# Patient Record
(demographics unavailable — no encounter records)

---

## 2017-01-19 NOTE — EDDOCDS
Nurse's Notes                                                                                     

[facility]                                                                         

Name: Keyana Lobato                                                                             

Age: 27 yrs                                                                                       

Sex: Female                                                                                       

: 1989                                                                                   

MRN: [id number]                                                                                     

Arrival Date: 2017                                                                          

Time: 18:23                                                                                       

Account#: [id number]                                                                              

Bed Triage 3                                                                                      

Private MD: NO PRIMARY PHYSICIAN, .                                                               

Diagnosis: Urinary tract infection, site not specified                                            

                                                                                                  

Presentation:                                                                                     

                                                                                             

18:37 Presenting complaint: Patient states: pt reports vaginal discharge, denies pain with    ead 

      urination. "I don't know if I have a UTI, BV, or a yeast infection." Reports lower          

      abdominal pain, pressure/cramping. Adult Sepsis Screening: The patient does not have        

      new or worsening altered mentation. Patient's respiratory rate is less than 22.             

      Systolic blood pressure is greater than 100. Patient has a qSOFA score of 0- Negative       

      Sepsis Screen. Suicide/Homicide risk assessment- the patient denies having any suicidal     

      and/or homicidal ideations and does not present with any other emotional, behavioral or     

      mental health complaints.  Status: Patient is not a  or       

      dependent. Transition of care: patient was not received from another setting of care.       

18:37 Acuity: ALEXANDR Level 3                                                                     ead 

18:37 Method Of Arrival: Walkin/Carried/Asstd                                                 ead 

                                                                                                  

Triage Assessment:                                                                                

18:39 General: Appears in no apparent distress, comfortable, Behavior is appropriate for age, ead 

      cooperative. Pain: Denies pain. HIV screening NA for this visit Offered previously. GI:     

      Reports lower abdominal pain. : Reports discharge Denies burning with urination,          

      vaginal bleeding. Derm: Skin is pink, warm & dry.                                         

                                                                                                  

OB/GYN:                                                                                           

18:39 LMP 2016                                                                          ead 

                                                                                                  

Historical:                                                                                       

- Allergies: Aspirin (Unknown); Latex (Rash); Macrobid (skin peeling and tongue swelling); Bactrim;

- Home Meds:                                                                                      

1. none                                                                                         

- PMHx: Anemia;                                                                                   

- PSHx: none;                                                                                     

- Social history: Smoking status: Patient states former smoker of tobacco. No barriers            

to communication noted, The patient speaks fluent English, Speaks appropriately for             

age.                                                                                            

- Family history: Not pertinent.                                                                  

- : The pt / caregiver states he / she is not on anticoagulants. Home medication list             

is obtained from the patient.                                                                   

- Exposure Risk Screening:: None identified.                                                      

                                                                                                  

                                                                                                  

Screenin:29 Screening information is obtained from the patient. Fall risk: No risks identified.     ck1 

      Assistance ADL's: requires no assistance with activities of daily living. Abuse/DV          

      Screen: The patient / caregiver reports he/she is: not in a situation that causes fear,     

      pain or injury. Nutritional screening: No deficits noted. Advance Directives:               

      Currently, there is no health care proxy. home support is adequate.                         

                                                                                                  

Assessment:                                                                                       

21:29 General: Appears in no apparent distress, comfortable, Behavior is appropriate for age, ck1 

      cooperative. Pain: Denies pain. Respiratory: Respiratory effort is unlabored,               

      Respiratory pattern is regular, symmetrical. : Denies burning with urination, vaginal     

      bleeding. Derm: Skin is intact, is healthy with good turgor, Skin is pink, warm & dry.    

                                                                                                  

Vital Signs:                                                                                      

18:25  / 87; Pulse 86; Resp 18 S; Temp 97.6(O); Pulse Ox 100% on R/A; Weight 50.8 kg    gr2 

      (R); Height 5 ft. 4 in. (162.56 cm) (R); Pain 2/10;                                         

21:27  / 80 LA Sitting (auto/reg); Pulse 71; Resp 16; Temp 97.9(O); Pulse Ox 99% on     jrd 

      R/A; Pain 0/10;                                                                             

18:25 Body Mass Index 19.22 (50.80 kg, 162.56 cm)                                             gr2 

                                                                                                  

Vitals:                                                                                           

18:25 Log In Time: 2017 at 18:25.                                                 gr2 

                                                                                                  

ED Course:                                                                                        

18:25 Patient visited by Gabriel Boateng.                                                   gr2 

18:25 NO PRIMARY PHYSICIAN, . is Private Physician.                                           gr2 

18:25 Patient moved to Waiting                                                                gr2 

18:26 Patient visited by Gabriel Boateng.                                                   gr2 

18:27 Patient moved to Pre RCE                                                                gr2 

18:38 Triage Initiated                                                                        ead 

20:31 Patient moved to Triage 3                                                               jrd 

20:34 Patient visited by Alexia Camilo RN.                                              ck1 

20:39 UCG- In Lab Sent.                                                                       jrd 

20:39 Urine Culture Sent.                                                                     jrd 

20:39 UA Sent.                                                                                jrd 

20:43 [facility]                                                             ef1 

20:43 Trev Pollard DO is Attending Physician.                                            ef1 

20:43 Patient visited by Tracee Pacheco PA-C.                                                  ef1 

21:13 Patient visited by Alethea Paez RN.                                                    nn1 

21:25 [facility] is Referral Physician.                               ef1 

21:27 Patient visited by Damien Ramirez PCA.                                               jrd 

21:29 The patient / caregiver is instructed regarding the plan of care and ED course.         ck1 

21:29 No IV's were initiated during this patient's visit. No procedures done that require     ck1 

      assistance.                                                                                 

                                                                                                  

Administered Medications:                                                                         

21:28 Drug: Ciprofloxacin 500 mg [ciprofloxacin 500 mg tablet (1 tabs)] Route: PO;            ck1 

21:28 Drug: Phenazopyridine 200 mg [phenazopyridine 100 mg tablet (2 tabs)] Route: PO;        ck1 

                                                                                                  

                                                                                                  

Order Results:                                                                                    

Lab Order: UA; SPEC'M 17 20:36                                                              

      Test: APPEARANCE, URINE; Value: HAZY; Range: CLEAR; Status: F                               

      Test: COLOR, URINE; Value: YELLOW; Range: YELLOW; Status: F                                 

      Test: PH,URINE; Value: 6.0; Range: 5.0-9.0; Units: UNITS; Status: F                         

      Test: SPECIFIC GRAVITY URINE AUTO; Value: 1.006; Range: 1.002-1.035; Status: F              

      Test: PROTEIN, URINE AUTO; Value: NEGATIVE; Range: NEGATIVE; Units: mg/dL; Status: F        

      Test: GLUCOSE, URINE (UA) AUTO; Value: NEGATIVE; Range: NEGATIVE; Units: mg/dL; Status:     

      F                                                                                           

      Test: KETONE, URINE AUTO; Value: NEGATIVE; Range: NEGATIVE; Units: mg/dL; Status: F         

      Test: UROBILINOGEN, URINE AUTO; Value: 0.2; Range: 0.0-2.0; Units: mg/dL; Status: F         

      Test: BILIRUBIN, URINE AUTO; Value: NEGATIVE; Range: NEGATIVE; Status: F                    

      Test: NITRITE, URINE AUTO; Value: POSITIVE; Range: NEGATIVE; Status: F                      

      Test: LEUKOCYTE ESTERASE, URINE AUTO; Value: 1+; Range: NEGATIVE; Abnormal: Above high      

      normal; Status: F                                                                           

      Test: BLOOD, URINE BLOOD; Value: NEGATIVE; Range: NEGATIVE; Status: F                       

      Test: SPERM, URINE AUTO; Range: NONE; Status: I                                             

      Test: WBC, URINE AUTO; Value: 7; Range: 0-3; Abnormal: Above high normal; Units: /HPF;      

      Status: F                                                                                   

      Test: RBC, URINE AUTO; Value: 2; Range: 0-3; Units: /HPF; Status: F                         

      Test: BACTERIA, URINE AUTO; Value: 2+; Range: NEGATIVE; Abnormal: Above high normal;        

      Status: F                                                                                   

      Test: SQUAMOUS EPITHELIAL CELL UR AU; Value: 5; Range: 0-6; Units: /HPF; Status: F          

      Test: MUCUS, URINE; Value: SMALL; Range: NEGATIVE; Status: F                                

      Test: HYALINE CAST, URINE AUTO; Value: 0; Range: 0-1; Units: /LPF; Status: F                

Lab Order: UCG- In Lab; SPEC'M 17 20:36                                                     

      Test: URINE PREG TEST; Value: NEGATIVE; Range: NEGATIVE; Status: F                          

                                                                                                  

Outcome:                                                                                          

21:25 Discharge ordered by Provider.                                                          ef1 

21:28 Discharge Assessment: Patient awake, alert and oriented x 3. No cognitive and/or        ck1 

      functional deficits noted. Patient verbalized understanding of disposition                  

      instructions. patient administered narcotics - no. The following High Risk Discharge        

      criteria are identified: None. Discharged to home ambulatory. Condition: stable.            

      Discharge instructions given to patient, Instructed on discharge instructions, follow       

      up and referral plans. medication usage, Demonstrated understanding of instructions,        

      medications, Pt was receptive of discharge instructions/ teaching. No special radiology     

      studies were completed. Property :Personal belongings accompany Pt.                         

21:31 Prescriptions given X 2.                                                                ck1 

21:32 Patient left the ED.                                                                    nn1 

                                                                                                  

Signatures:                                                                                       

Alexia Camilo,RN                  RN   ck1                                                  

Tracee Pacheco PAPerlaC                      PA-C ef1                                                  

Gabriel Boateng                            gr2                                                  

Mendy TorreRN                        Damien Blue PCA                   PCA  Alethea Arnold RN                        RN   nn1                                                  

                                                                                                  

**************************************************************************************************

MTDD

## 2017-01-19 NOTE — EDDOCDS
Physician Documentation                                                                           

[facility]                                                                         

Name: Keyana Lobato                                                                             

Age: 27 yrs                                                                                       

Sex: Female                                                                                       

: 1989                                                                                   

MRN: [id number]                                                                                     

Arrival Date: 2017                                                                          

Time: 18:23                                                                                       

Account#: [id number]                                                                              

Bed Triage 3                                                                                      

Private MD: NO PRIMARY PHYSICIAN, .                                                               

Disposition:                                                                                      

17 21:25 Discharged to Home/Self Care. Impression: Urinary tract infection, site not        

specified.                                                                                      

- Condition is Stable.                                                                            

- Discharge Instructions: Urinary Tract Infection, Easy-to-Read.                                  

- Prescriptions for Cipro 500 mg Oral Tablet - take 1 tablet by ORAL route every 12               

hours; 14 tablet. Pyridium 200 mg Oral Tablet - take 1 tablet by ORAL route every 8             

hours for 3 days; 9 tablet.                                                                     

- Medication Reconciliation, Referral List Call for Appointment, Local Pharmacy Hours             

form.                                                                                           

- [facility] ; When: 1 - 2 days; Reason: Recheck                

today's complaints, Continuance of care. Follow up: Emergency Department; Reason:               

Worsening of conditions.                                                                        

- Problem is new.                                                                                 

- Symptoms have improved.                                                                         

                                                                                                  

                                                                                                  

                                                                                                  

Historical:                                                                                       

- Allergies: Aspirin (Unknown); Latex (Rash); Macrobid (skin peeling and tongue swelling); Bactrim;

- Home Meds:                                                                                      

1. none                                                                                         

- PMHx: Anemia;                                                                                   

- PSHx: none;                                                                                     

- Social history: Smoking status: Patient states former smoker of tobacco. No barriers            

to communication noted, The patient speaks fluent English, Speaks appropriately for             

age.                                                                                            

- Family history: Not pertinent.                                                                  

- : The pt / caregiver states he / she is not on anticoagulants. Home medication list             

is obtained from the patient.                                                                   

- Exposure Risk Screening:: None identified.                                                      

                                                                                                  

                                                                                                  

OB/GYN:                                                                                           

                                                                                             

18:39 LMP 2016                                                                          ead 

                                                                                                  

Vital Signs:                                                                                      

18:25  / 87; Pulse 86; Resp 18 S; Temp 97.6(O); Pulse Ox 100% on R/A; Weight 50.8 kg /  gr2 

      111.99 lbs (R); Height 5 ft. 4 in. (162.56 cm) (R); Pain 2/10;                              

21:27  / 80 LA Sitting (auto/reg); Pulse 71; Resp 16; Temp 97.9(O); Pulse Ox 99% on     jrd 

      R/A; Pain 0/10;                                                                             

18:25 Body Mass Index 19.22 (50.80 kg, 162.56 cm)                                             gr2 

                                                                                                  

MDM:                                                                                              

18:28 UA Ordered.                                                                             EDMS

18:28 Urine Culture Ordered.                                                                  EDMS

18:28 UCG- In Lab Ordered.                                                                    EDMS

21:17 UA Reviewed.                                                                            ef1 

21:17 UCG- In Lab Reviewed.                                                                   ef1 

21:24 Ciprofloxacin 500 mg PO once ordered.                                                   ef1 

21:24 Phenazopyridine 200 mg PO once ordered.                                                 ef1 

                                                                                                  

Administered Medications:                                                                         

21:28 Drug: Ciprofloxacin 500 mg [ciprofloxacin 500 mg tablet (1 tabs)] Route: PO;            ck1 

21:28 Drug: Phenazopyridine 200 mg [phenazopyridine 100 mg tablet (2 tabs)] Route: PO;        ck1 

                                                                                                  

                                                                                                  

Signatures:                                                                                       

Dispatcher [employer]                           EDAlexia Nye,RN                  RN   ck1                                                  

Tracee Pacheco, PA-C                      PA-C ef1                                                  

Mendy Torre,RN                        RN   Alethea BermudezRN                        RN   nn1                                                  

                                                                                                  

**************************************************************************************************

MTDD

## 2017-01-21 NOTE — EDDOCDS
Nurse's Notes                                                                                     

[facility]                                                                         

Name: Keyana Lobato                                                                             

Age: 27 yrs                                                                                       

Sex: Female                                                                                       

: 1989                                                                                   

MRN: [id number]                                                                                     

Arrival Date: 2017                                                                          

Time: 18:23                                                                                       

Account#: [id number]                                                                              

Bed Triage 3                                                                                      

Private MD: NO PRIMARY PHYSICIAN, .                                                               

Diagnosis: Urinary tract infection, site not specified                                            

                                                                                                  

Presentation:                                                                                     

                                                                                             

18:37 Presenting complaint: Patient states: pt reports vaginal discharge, denies pain with    ead 

      urination. "I don't know if I have a UTI, BV, or a yeast infection." Reports lower          

      abdominal pain, pressure/cramping. Adult Sepsis Screening: The patient does not have        

      new or worsening altered mentation. Patient's respiratory rate is less than 22.             

      Systolic blood pressure is greater than 100. Patient has a qSOFA score of 0- Negative       

      Sepsis Screen. Suicide/Homicide risk assessment- the patient denies having any suicidal     

      and/or homicidal ideations and does not present with any other emotional, behavioral or     

      mental health complaints.  Status: Patient is not a  or       

      dependent. Transition of care: patient was not received from another setting of care.       

18:37 Acuity: ALEXANDR Level 3                                                                     ead 

18:37 Method Of Arrival: Walkin/Carried/Asstd                                                 ead 

                                                                                                  

Triage Assessment:                                                                                

18:39 General: Appears in no apparent distress, comfortable, Behavior is appropriate for age, ead 

      cooperative. Pain: Denies pain. HIV screening NA for this visit Offered previously. GI:     

      Reports lower abdominal pain. : Reports discharge Denies burning with urination,          

      vaginal bleeding. Derm: Skin is pink, warm & dry.                                         

                                                                                                  

OB/GYN:                                                                                           

18:39 LMP 2016                                                                          ead 

                                                                                                  

Historical:                                                                                       

- Allergies: Aspirin (Unknown); Latex (Rash); Macrobid (skin peeling and tongue swelling); Bactrim;

- Home Meds:                                                                                      

1. none                                                                                         

- PMHx: Anemia;                                                                                   

- PSHx: none;                                                                                     

- Social history: Smoking status: Patient states former smoker of tobacco. No barriers            

to communication noted, The patient speaks fluent English, Speaks appropriately for             

age.                                                                                            

- Family history: Not pertinent.                                                                  

- : The pt / caregiver states he / she is not on anticoagulants. Home medication list             

is obtained from the patient.                                                                   

- Exposure Risk Screening:: None identified.                                                      

                                                                                                  

                                                                                                  

Screenin:29 Screening information is obtained from the patient. Fall risk: No risks identified.     ck1 

      Assistance ADL's: requires no assistance with activities of daily living. Abuse/DV          

      Screen: The patient / caregiver reports he/she is: not in a situation that causes fear,     

      pain or injury. Nutritional screening: No deficits noted. Advance Directives:               

      Currently, there is no health care proxy. home support is adequate.                         

                                                                                                  

Assessment:                                                                                       

21:29 General: Appears in no apparent distress, comfortable, Behavior is appropriate for age, ck1 

      cooperative. Pain: Denies pain. Respiratory: Respiratory effort is unlabored,               

      Respiratory pattern is regular, symmetrical. : Denies burning with urination, vaginal     

      bleeding. Derm: Skin is intact, is healthy with good turgor, Skin is pink, warm & dry.    

                                                                                                  

Vital Signs:                                                                                      

18:25  / 87; Pulse 86; Resp 18 S; Temp 97.6(O); Pulse Ox 100% on R/A; Weight 50.8 kg    gr2 

      (R); Height 5 ft. 4 in. (162.56 cm) (R); Pain 2/10;                                         

21:27  / 80 LA Sitting (auto/reg); Pulse 71; Resp 16; Temp 97.9(O); Pulse Ox 99% on     jrd 

      R/A; Pain 0/10;                                                                             

18:25 Body Mass Index 19.22 (50.80 kg, 162.56 cm)                                             gr2 

                                                                                                  

Vitals:                                                                                           

18:25 Log In Time: 2017 at 18:25.                                                 gr2 

                                                                                                  

ED Course:                                                                                        

18:25 Patient visited by Gabriel Boateng.                                                   gr2 

18:25 NO PRIMARY PHYSICIAN, . is Private Physician.                                           gr2 

18:25 Patient moved to Waiting                                                                gr2 

18:26 Patient visited by Gabriel Boateng.                                                   gr2 

18:27 Patient moved to Pre RCE                                                                gr2 

18:38 Triage Initiated                                                                        ead 

20:31 Patient moved to Triage 3                                                               jrd 

20:34 Patient visited by Alexia Camilo RN.                                              ck1 

20:39 UCG- In Lab Sent.                                                                       jrd 

20:39 Urine Culture Sent.                                                                     jrd 

20:39 UA Sent.                                                                                jrd 

20:43 [facility]                                                             ef1 

20:43 Trev Pollard DO is Attending Physician.                                            ef1 

20:43 Patient visited by Tracee Pacheco PA-C.                                                  ef1 

21:13 Patient visited by Alethea Paez RN.                                                    nn1 

21:25 [facility] is Referral Physician.                               ef1 

21:27 Patient visited by Damien Ramirez PCA.                                               jrd 

21:29 The patient / caregiver is instructed regarding the plan of care and ED course.         ck1 

21:29 No IV's were initiated during this patient's visit. No procedures done that require     ck1 

      assistance.                                                                                 

21:35 [facility] Payment Agreement was scanned into [employer] and attached to record.               gjb 

                                                                                             

10:33 T-Sheet-- Draft Copy was scanned into [employer] and attached to record.                   gb  

                                                                                                  

Administered Medications:                                                                         

                                                                                             

21:28 Drug: Ciprofloxacin 500 mg [ciprofloxacin 500 mg tablet (1 tabs)] Route: PO;            ck1 

21:28 Drug: Phenazopyridine 200 mg [phenazopyridine 100 mg tablet (2 tabs)] Route: PO;        ck1 

                                                                                                  

                                                                                                  

Order Results:                                                                                    

Lab Order: UA; SPEC'M 17 20:36                                                              

      Test: APPEARANCE, URINE; Value: HAZY; Range: CLEAR; Status: F                               

      Test: COLOR, URINE; Value: YELLOW; Range: YELLOW; Status: F                                 

      Test: PH,URINE; Value: 6.0; Range: 5.0-9.0; Units: UNITS; Status: F                         

      Test: SPECIFIC GRAVITY URINE AUTO; Value: 1.006; Range: 1.002-1.035; Status: F              

      Test: PROTEIN, URINE AUTO; Value: NEGATIVE; Range: NEGATIVE; Units: mg/dL; Status: F        

      Test: GLUCOSE, URINE (UA) AUTO; Value: NEGATIVE; Range: NEGATIVE; Units: mg/dL; Status:     

      F                                                                                           

      Test: KETONE, URINE AUTO; Value: NEGATIVE; Range: NEGATIVE; Units: mg/dL; Status: F         

      Test: UROBILINOGEN, URINE AUTO; Value: 0.2; Range: 0.0-2.0; Units: mg/dL; Status: F         

      Test: BILIRUBIN, URINE AUTO; Value: NEGATIVE; Range: NEGATIVE; Status: F                    

      Test: NITRITE, URINE AUTO; Value: POSITIVE; Range: NEGATIVE; Status: F                      

      Test: LEUKOCYTE ESTERASE, URINE AUTO; Value: 1+; Range: NEGATIVE; Abnormal: Above high      

      normal; Status: F                                                                           

      Test: BLOOD, URINE BLOOD; Value: NEGATIVE; Range: NEGATIVE; Status: F                       

      Test: SPERM, URINE AUTO; Range: NONE; Status: I                                             

      Test: WBC, URINE AUTO; Value: 7; Range: 0-3; Abnormal: Above high normal; Units: /HPF;      

      Status: F                                                                                   

      Test: RBC, URINE AUTO; Value: 2; Range: 0-3; Units: /HPF; Status: F                         

      Test: BACTERIA, URINE AUTO; Value: 2+; Range: NEGATIVE; Abnormal: Above high normal;        

      Status: F                                                                                   

      Test: SQUAMOUS EPITHELIAL CELL UR AU; Value: 5; Range: 0-6; Units: /HPF; Status: F          

      Test: MUCUS, URINE; Value: SMALL; Range: NEGATIVE; Status: F                                

      Test: HYALINE CAST, URINE AUTO; Value: 0; Range: 0-1; Units: /LPF; Status: F                

Lab Order: Urine Culture; SPEC'M 17 20:36                                                   

      Test: URINE CULTURE; Value: ORGANISM 1: ESCHERICHIA COLI; Status: F                         

      Test: URINE CULTURE; Value: ESCHERICHIA COLI; Status: F                                     

      Test: URINE CULTURE; Value: COLONY COUNT CFU/ml >100,000; Status: F                         

      Test: URINE CULTURE; Value: GRAM NEG SENSI - VITEK 80; Status: F                            

      Test: URINE CULTURE; Value: Method: VIT2; Status: F                                         

      Test: URINE CULTURE; Value: EXTD BRD SPCTRM BETA LACTAMASE -; Status: F                     

      Test: URINE CULTURE; Value: TRIMETHOPRIM/SULFAMETHOXAZOLE <=20 S; Status: F                 

      Test: URINE CULTURE; Value: AMPICILLIN 8 S; Status: F                                       

      Test: URINE CULTURE; Value: GENTAMICIN <=1 S; Status: F                                     

      Test: URINE CULTURE; Value: NITROFURANTOIN <=16 S; Status: F                                

      Test: URINE CULTURE; Value: CEFAZOLIN <=4 S; Status: F                                      

      Test: URINE CULTURE; Value: LEVOFLOXACIN <=0.12 S; Status: F                                

      Test: URINE CULTURE; Value: TOBRAMYCIN <=1 S; Status: F                                     

      Test: URINE CULTURE; Value: CEFTRIAXONE <=1 S; Status: F                                    

      Test: URINE CULTURE; Value: CEFTAZIDIME <=1 S; Status: F                                    

      Test: URINE CULTURE; Value: AMPICILLIN/SULBACTAM 4 S; Status: F                             

      Test: URINE CULTURE; Value: PIPERACILLIN/TAZOBACTAM <=4 S; Status: F                        

      Test: URINE CULTURE; Value: AZTREONAM <=1 S; Status: F                                      

      Test: URINE CULTURE; Value: ERTAPENEM <=0.5 S; Status: F                                    

      Test: URINE CULTURE; Value: MEROPENEM <=0.25 S; Status: F                                   

      Test: URINE CULTURE; Value: TIGECYCLINE <=0.5 S; Status: F                                  

      Test: URINE CULTURE; Value: CEFEPIME <=1 S; Status: F                                       

Lab Order: UCG- In Lab; SPEC'M 17 20:36                                                     

      Test: URINE PREG TEST; Value: NEGATIVE; Range: NEGATIVE; Status: F                          

                                                                                                  

Outcome:                                                                                          

21:25 Discharge ordered by Provider.                                                          ef1 

21:28 Discharge Assessment: Patient awake, alert and oriented x 3. No cognitive and/or        ck1 

      functional deficits noted. Patient verbalized understanding of disposition                  

      instructions. patient administered narcotics - no. The following High Risk Discharge        

      criteria are identified: None. Discharged to home ambulatory. Condition: stable.            

      Discharge instructions given to patient, Instructed on discharge instructions, follow       

      up and referral plans. medication usage, Demonstrated understanding of instructions,        

      medications, Pt was receptive of discharge instructions/ teaching. No special radiology     

      studies were completed. Property :Personal belongings accompany Pt.                         

21:31 Prescriptions given X 2.                                                                ck1 

21:32 Patient left the ED.                                                                    nn1 

                                                                                                  

Signatures:                                                                                       

Emelia Sánchez, Reg                  Reg  Alexia Barry,RN                  RN   ck1                                                  

Tracee Pacheco PAPerlaC                      PA-C ef1                                                  

Gabriel Boateng                            gr2                                                  

Mendy Torre,RN                        RN   Damien Schmitt PCA                   PCA  Alethea Arnold RN                        RN   nn1                                                  

Zofia Raphael                                                  

                                                                                                  

**************************************************************************************************



*** Chart Complete ***
MTDD

## 2017-01-21 NOTE — EDDOCDS
Physician Documentation                                                                           

[facility]                                                                         

Name: Keyana Lobato                                                                             

Age: 27 yrs                                                                                       

Sex: Female                                                                                       

: 1989                                                                                   

MRN: [id number]                                                                                     

Arrival Date: 2017                                                                          

Time: 18:23                                                                                       

Account#: [id number]                                                                              

Bed Triage 3                                                                                      

Private MD: NO PRIMARY PHYSICIAN, .                                                               

Disposition:                                                                                      

17 21:25 Discharged to Home/Self Care. Impression: Urinary tract infection, site not        

specified.                                                                                      

- Condition is Stable.                                                                            

- Discharge Instructions: Urinary Tract Infection, Easy-to-Read.                                  

- Prescriptions for Cipro 500 mg Oral Tablet - take 1 tablet by ORAL route every 12               

hours; 14 tablet. Pyridium 200 mg Oral Tablet - take 1 tablet by ORAL route every 8             

hours for 3 days; 9 tablet.                                                                     

- Medication Reconciliation, Referral List Call for Appointment, Local Pharmacy Hours             

form.                                                                                           

- [facility] up: [facility] ; When: 1 - 2 days; Reason: Recheck                

today's complaints, Continuance of care. Follow up: Emergency Department; Reason:               

Worsening of conditions.                                                                        

- Problem is new.                                                                                 

- Symptoms have improved.                                                                         

                                                                                                  

                                                                                                  

                                                                                                  

Historical:                                                                                       

- Allergies: Aspirin (Unknown); Latex (Rash); Macrobid (skin peeling and tongue swelling); Bactrim;

- Home Meds:                                                                                      

1. none                                                                                         

- PMHx: Anemia;                                                                                   

- PSHx: none;                                                                                     

- Social history: Smoking status: Patient states former smoker of tobacco. No barriers            

to communication noted, The patient speaks fluent English, Speaks appropriately for             

age.                                                                                            

- Family history: Not pertinent.                                                                  

- : The pt / caregiver states he / she is not on anticoagulants. Home medication list             

is obtained from the patient.                                                                   

- Exposure Risk Screening:: None identified.                                                      

                                                                                                  

                                                                                                  

OB/GYN:                                                                                           

                                                                                             

18:39 LMP 2016                                                                          ead 

                                                                                                  

Vital Signs:                                                                                      

18:25  / 87; Pulse 86; Resp 18 S; Temp 97.6(O); Pulse Ox 100% on R/A; Weight 50.8 kg /  gr2 

      111.99 lbs (R); Height 5 ft. 4 in. (162.56 cm) (R); Pain 2/10;                              

21:27  / 80 LA Sitting (auto/reg); Pulse 71; Resp 16; Temp 97.9(O); Pulse Ox 99% on     jrd 

      R/A; Pain 0/10;                                                                             

18:25 Body Mass Index 19.22 (50.80 kg, 162.56 cm)                                             gr2 

                                                                                                  

MDM:                                                                                              

18:28 UA Ordered.                                                                             EDMS

18:28 Urine Culture Ordered.                                                                  EDMS

18:28 UCG- In Lab Ordered.                                                                    EDMS

21:17 UA Reviewed.                                                                            ef1 

21:17 UCG- In Lab Reviewed.                                                                   ef1 

21:24 Ciprofloxacin 500 mg PO once ordered.                                                   ef1 

21:24 Phenazopyridine 200 mg PO once ordered.                                                 ef1 

21:35 [facility] Payment Agreement was scanned into IF[employer] and attached to record.               [facility] 

: Financial registration complete.                                                        b 

01/20                                                                                             

10: T-Sheet-- Draft Copy was scanned into IForem and attached to record.                   gb  

                                                                                                  

Administered Medications:                                                                         

                                                                                             

21:28 Drug: Ciprofloxacin 500 mg [ciprofloxacin 500 mg tablet (1 tabs)] Route: PO;            ck1 

:28 Drug: Phenazopyridine 200 mg [phenazopyridine 100 mg tablet (2 tabs)] Route: PO;        ck1 

                                                                                                  

                                                                                                  

Signatures:                                                                                       

Dispatcher [employer]                           EDEmelia Rodriguez, Reg                  Reg  gb                                                   

Alexia Camilo,RN                  RN   ck1                                                  

Tracee Pacheco, PA-C                      PA-C ef1                                                  

Mendy TorreRN                        RN   Alethea BermudezRN                        RN   nn1                                                  

Zofia Raphael                               [facility]                                                  

                                                                                                  

The chart was reviewed and I authenticate all verbal orders and agree with the evaluation and 
treatment provided.Attachments:

:35 [facility] Payment Agreement                                                                [facility] 

                                                                                             

10:33 T-Sheet-- Draft Copy                                                                    gb  

                                                                                                  

**************************************************************************************************



*** Chart Complete ***
MTDD

## 2017-01-21 NOTE — EDDOCDS
Physician Documentation                                                                           

                                                                         

Name: Keyana Lobato                                                                             

Age: 27 yrs                                                                                       

Sex: Female                                                                                       

: 1989                                                                                   

MRN: [id number]                                                                                     

Arrival Date: 2017                                                                          

Time: 18:23                                                                                       

Account#: [id number]                                                                              

Bed Triage 3                                                                                      

Private MD: NO PRIMARY PHYSICIAN, .                                                               

Disposition:                                                                                      

17 21:25 Discharged to Home/Self Care. Impression: Urinary tract infection, site not        

specified.                                                                                      

- Condition is Stable.                                                                            

- Discharge Instructions: Urinary Tract Infection, Easy-to-Read.                                  

- Prescriptions for Cipro 500 mg Oral Tablet - take 1 tablet by ORAL route every 12               

hours; 14 tablet. Pyridium 200 mg Oral Tablet - take 1 tablet by ORAL route every 8             

hours for 3 days; 9 tablet.                                                                     

- Medication Reconciliation, Referral List Call for Appointment, Local Pharmacy Hours             

form.                                                                                           

- Follow up: [facility] ; When: 1 - 2 days; Reason: Recheck                

today's complaints, Continuance of care. Follow up: Emergency Department; Reason:               

Worsening of conditions.                                                                        

- Problem is new.                                                                                 

- Symptoms have improved.                                                                         

                                                                                                  

                                                                                                  

                                                                                                  

Historical:                                                                                       

- Allergies: Aspirin (Unknown); Latex (Rash); Macrobid (skin peeling and tongue swelling); Bactrim;

- Home Meds:                                                                                      

1. none                                                                                         

- PMHx: Anemia;                                                                                   

- PSHx: none;                                                                                     

- Social history: Smoking status: Patient states former smoker of tobacco. No barriers            

to communication noted, The patient speaks fluent English, Speaks appropriately for             

age.                                                                                            

- Family history: Not pertinent.                                                                  

- : The pt / caregiver states he / she is not on anticoagulants. Home medication list             

is obtained from the patient.                                                                   

- Exposure Risk Screening:: None identified.                                                      

                                                                                                  

                                                                                                  

OB/GYN:                                                                                           

                                                                                             

18:39 LMP 2016                                                                          ead 

                                                                                                  

Vital Signs:                                                                                      

18:25  / 87; Pulse 86; Resp 18 S; Temp 97.6(O); Pulse Ox 100% on R/A; Weight 50.8 kg /  gr2 

      111.99 lbs (R); Height 5 ft. 4 in. (162.56 cm) (R); Pain 2/10;                              

21:27  / 80 LA Sitting (auto/reg); Pulse 71; Resp 16; Temp 97.9(O); Pulse Ox 99% on     jrd 

      R/A; Pain 0/10;                                                                             

18:25 Body Mass Index 19.22 (50.80 kg, 162.56 cm)                                             gr2 

                                                                                                  

MDM:                                                                                              

18:28 UA Ordered.                                                                             EDMS

18:28 Urine Culture Ordered.                                                                  EDMS

18:28 UCG- In Lab Ordered.                                                                    EDMS

21:17 UA Reviewed.                                                                            ef1 

21:17 UCG- In Lab Reviewed.                                                                   ef1 

21:24 Ciprofloxacin 500 mg PO once ordered.                                                   ef1 

21:24 Phenazopyridine 200 mg PO once ordered.                                                 ef1 

21:35 [facility] Payment Agreement was scanned into [employer] and attached to record.               [facility] 

: Financial registration complete.                                                        b 

01/20                                                                                             

10: T-Sheet-- Draft Copy was scanned into [employer] and attached to record.                   gb  

                                                                                                  

Administered Medications:                                                                         

                                                                                             

21:28 Drug: Ciprofloxacin 500 mg [ciprofloxacin 500 mg tablet (1 tabs)] Route: PO;            ck1 

:28 Drug: Phenazopyridine 200 mg [phenazopyridine 100 mg tablet (2 tabs)] Route: PO;        ck1 

                                                                                                  

                                                                                                  

Signatures:                                                                                       

Dispatcher [employer]                           EDEmelia Rodriguez, Reg                  Reg  gb                                                   

Alexia Camilo,RN                  RN   ck1                                                  

Tracee Pacheco, PA-C                      PA-C ef1                                                  

Mendy TorreRN                        RN   Alethea BermudezRN                        RN   nn1                                                  

Zofia Raphael                               [facility]                                                  

                                                                                                  

The chart was reviewed and I authenticate all verbal orders and agree with the evaluation and 
treatment provided.Attachments:

:35 [facility] Payment Agreement                                                                [facility] 

                                                                                             

10:33 T-Sheet-- Draft Copy                                                                    gb  

                                                                                                  

**************************************************************************************************



*** Chart Complete ***
MTDD